# Patient Record
Sex: FEMALE | Race: WHITE | HISPANIC OR LATINO | URBAN - METROPOLITAN AREA
[De-identification: names, ages, dates, MRNs, and addresses within clinical notes are randomized per-mention and may not be internally consistent; named-entity substitution may affect disease eponyms.]

---

## 2017-10-25 ENCOUNTER — GENERIC CONVERSION - ENCOUNTER (OUTPATIENT)
Dept: OTHER | Facility: OTHER | Age: 22
End: 2017-10-25

## 2017-10-25 PROCEDURE — 87591 N.GONORRHOEAE DNA AMP PROB: CPT | Performed by: NURSE PRACTITIONER

## 2017-10-25 PROCEDURE — 87491 CHLMYD TRACH DNA AMP PROBE: CPT | Performed by: NURSE PRACTITIONER

## 2017-10-30 ENCOUNTER — LAB REQUISITION (OUTPATIENT)
Dept: LAB | Facility: HOSPITAL | Age: 22
End: 2017-10-30
Payer: COMMERCIAL

## 2017-10-30 DIAGNOSIS — Z11.3 ENCOUNTER FOR SCREENING FOR INFECTIONS WITH PREDOMINANTLY SEXUAL MODE OF TRANSMISSION: ICD-10-CM

## 2017-11-01 LAB
CHLAMYDIA DNA CVX QL NAA+PROBE: NORMAL
N GONORRHOEA DNA GENITAL QL NAA+PROBE: NORMAL

## 2017-11-03 ENCOUNTER — GENERIC CONVERSION - ENCOUNTER (OUTPATIENT)
Dept: OTHER | Facility: OTHER | Age: 22
End: 2017-11-03

## 2018-01-10 ENCOUNTER — GENERIC CONVERSION - ENCOUNTER (OUTPATIENT)
Dept: OTHER | Facility: OTHER | Age: 23
End: 2018-01-10

## 2018-01-10 NOTE — RESULT NOTES
Verified Results  (1) CHLAMYDIA/GC AMPLIFIED DNA, PCR [id number] 06:12PM Melvin Eugenio     Test Name Result Flag Reference   CHLAMYDIA,AMPLIFIED DNA PROBE   C  trachomatis Amplified DNA Negative   C  trachomatis Amplified DNA Negative   N  GONORRHOEAE AMPLIFIED DNA   N  gonorrhoeae Amplified DNA Negative   N  gonorrhoeae Amplified DNA Negative

## 2018-01-22 VITALS
DIASTOLIC BLOOD PRESSURE: 60 MMHG | BODY MASS INDEX: 37.28 KG/M2 | HEIGHT: 66 IN | WEIGHT: 232 LBS | SYSTOLIC BLOOD PRESSURE: 100 MMHG

## 2018-01-23 NOTE — MISCELLANEOUS
Message   Recorded as Task   Date: 01/10/2018 01:33 PM, Created By: Anthony Austin   Task Name: Med Renewal Request   Assigned To: Salo Cutler   Regarding Patient: Isrrael Jasso, Status: In Progress   Comment:    Elena Higginbotham - 10 Vitaly 2018 1:33 PM     TASK CREATED  Caller: Self; Renew Medication; [phone] (Mobile Phone)  Pt needs refill on Gianvi sent to the [facility] on file  Lalitha Sharma - 10 Vitaly 2018 1:57 PM     TASK IN PROGRESS   Lalitha Sharma - 10 Vitaly 2018 2:12 PM     TASK EDITED  sent        Active Problems    1  Acne (706 1) (L70 9)   2  Encounter for gynecological examination without abnormal finding (V72 31) (Z01 419)   3  Oral contraceptive prescribed (V25 01) (Z30 011)   4  Screening for STD (sexually transmitted disease) (V74 5) (Z11 3)    Allergies    1   No Known Drug Allergies    Signatures   Electronically signed by : Christiano Oconnor, ; Vitaly 10 2018  2:12PM EST                       (Author)